# Patient Record
Sex: MALE | Race: WHITE
[De-identification: names, ages, dates, MRNs, and addresses within clinical notes are randomized per-mention and may not be internally consistent; named-entity substitution may affect disease eponyms.]

---

## 2022-03-23 LAB
BUN SERPL-MCNC: 14 MG/DL (ref 9–20)
BUN/CREAT SERPL: 18 %
CALCIUM SERPL-MCNC: 9.3 MG/DL (ref 8.4–10.2)
HCT VFR BLD CALC: 43.9 % (ref 35.5–45.6)
HEMOLYSIS INDEX: 6
HGB BLD-MCNC: 14.6 GM/DL (ref 11.8–15.2)
MCHC RBC AUTO-ENTMCNC: 33 % (ref 32–34)
MCV RBC AUTO: 94 FL (ref 84–94)
PLATELET # BLD: 173 K/MM3 (ref 140–440)
RBC # BLD AUTO: 4.65 M/MM3 (ref 3.65–5.03)

## 2022-03-24 ENCOUNTER — HOSPITAL ENCOUNTER (OUTPATIENT)
Dept: HOSPITAL 5 - OR | Age: 77
Discharge: HOME | End: 2022-03-24
Attending: SURGERY
Payer: COMMERCIAL

## 2022-03-24 VITALS — DIASTOLIC BLOOD PRESSURE: 86 MMHG | SYSTOLIC BLOOD PRESSURE: 145 MMHG

## 2022-03-24 DIAGNOSIS — Z85.46: ICD-10-CM

## 2022-03-24 DIAGNOSIS — E78.5: ICD-10-CM

## 2022-03-24 DIAGNOSIS — Z98.41: ICD-10-CM

## 2022-03-24 DIAGNOSIS — K40.41: Primary | ICD-10-CM

## 2022-03-24 DIAGNOSIS — Z98.42: ICD-10-CM

## 2022-03-24 DIAGNOSIS — K21.9: ICD-10-CM

## 2022-03-24 DIAGNOSIS — I10: ICD-10-CM

## 2022-03-24 DIAGNOSIS — Z20.822: ICD-10-CM

## 2022-03-24 DIAGNOSIS — J45.909: ICD-10-CM

## 2022-03-24 DIAGNOSIS — Z79.899: ICD-10-CM

## 2022-03-24 DIAGNOSIS — Z98.890: ICD-10-CM

## 2022-03-24 PROCEDURE — 85027 COMPLETE CBC AUTOMATED: CPT

## 2022-03-24 PROCEDURE — C1781 MESH (IMPLANTABLE): HCPCS

## 2022-03-24 PROCEDURE — 80048 BASIC METABOLIC PNL TOTAL CA: CPT

## 2022-03-24 PROCEDURE — 36415 COLL VENOUS BLD VENIPUNCTURE: CPT

## 2022-03-24 PROCEDURE — 49651 LAP ING HERNIA REPAIR RECUR: CPT

## 2022-03-24 PROCEDURE — U0003 INFECTIOUS AGENT DETECTION BY NUCLEIC ACID (DNA OR RNA); SEVERE ACUTE RESPIRATORY SYNDROME CORONAVIRUS 2 (SARS-COV-2) (CORONAVIRUS DISEASE [COVID-19]), AMPLIFIED PROBE TECHNIQUE, MAKING USE OF HIGH THROUGHPUT TECHNOLOGIES AS DESCRIBED BY CMS-2020-01-R: HCPCS

## 2022-03-24 RX ADMIN — HYDROMORPHONE HYDROCHLORIDE PRN MG: 1 INJECTION, SOLUTION INTRAMUSCULAR; INTRAVENOUS; SUBCUTANEOUS at 15:50

## 2022-03-24 RX ADMIN — HYDROMORPHONE HYDROCHLORIDE PRN MG: 1 INJECTION, SOLUTION INTRAMUSCULAR; INTRAVENOUS; SUBCUTANEOUS at 15:40

## 2022-03-24 RX ADMIN — HYDROMORPHONE HYDROCHLORIDE PRN MG: 1 INJECTION, SOLUTION INTRAMUSCULAR; INTRAVENOUS; SUBCUTANEOUS at 15:30

## 2022-03-24 NOTE — OPERATIVE REPORT
Operative Report


Operative Report: 





Date: 3/24/2022





Preop diagnosis: Left inguinal hernia





Postop diagnosis: Same





Procedure: Robotic left inguinal hernia repair with mesh





Surgeon: Dr. Perez





Assistant: Dr. Brown





Anesthesia: SHADIA estimated blood loss: Minimal





Specimen: None





Findings: This patient presents with bilateral inguinal hernias.  He is taken to

the OR and under general endotracheal anesthesia timeout is completed consents 

on the chart.  Patient received 2 g of Ancef IV.  Elias catheter is in place.





The abdomen is shaved and prepped with ChloraPrep.  Sterilely draped.





A supraumbilical incision is made.  Varies needle was placed through this.  

Abdomen is insufflated with CO2.  A 5 mm Visiport is used to gain access at the 

supraumbilical incision.  And the abdomen is inspected.





8 mm port is established in the right and left hypogastric areas.  The 5 Smith 

port is removed and a 12 mm port is established.  The robot is then docked in 

the usual fashion.





Cautery scissors is used in arm 1 and the bipolar grasper in arm two.  30 degree

angled scope was used.





Peritoneal incision is made in the left lower quadrant.  Properitoneal 

dissection is then performed to expose the pubic tubercle the hernia and cord 

structures, and the lateral space.  The umbilical hernia is completely dissected

free of the internal ring.





Left  large 3D Bard mesh is then placed into the abdomen.  The mesh is placed 

into the properitoneal dissection.  Medial and lateral sutures are used with 2-0

Vicryl to fix the edges of the mesh in place.  The peritoneal incision is in 

reapproximated using 3-0 barbed suture.





Sponge and needle counts are noted and are correct.  The supraumbilical incision

is closed with 2-0 Vicryl sutures.  CO2 is allowed to exit the abdomen.  All 

ports were then removed.  Skin incisions were closed with 4-0 Vicryl and 

Dermabond.  Patient tolerated seizure well.

## 2022-03-24 NOTE — ANESTHESIA CONSULTATION
Anesthesia Consult and Med Hx


Date of service: 03/24/22





- Airway


Anesthetic Teeth Evaluation: Good


ROM Head & Neck: Adequate


Mental/Hyoid Distance: Adequate


Mallampati Class: Class I


Intubation Access Assessment: Good





- Pulmonary Exam


CTA: Yes





- Cardiac Exam


Cardiac Exam: RRR





- Pre-Operative Health Status


ASA Pre-Surgery Classification: ASA2


Proposed Anesthetic Plan: General





- Pulmonary


Hx Smoking: No


Hx Pneumonia: No





- Central Nervous System


Hx Psychiatric Problems: No





- Other Systems


Hx Alcohol Use: No


Hx Substance Use: No


Hx Cancer: Yes

## 2022-03-24 NOTE — SHORT STAY SUMMARY
Short Stay Documentation


Date of service: 03/24/22


Narrative H&P: 





This 77-year-old [place]ese gentleman is admitted with a left inguinal hernia.





- History


Past Medical History: No medical history


Past Surgical History: No surgical history


Social history: no significant social history





- Allergies and Medications


Current Medications: 


                                    Allergies





No Known Allergies Allergy (Verified 03/23/22 11:11)


   





                                Home Medications











 Medication  Instructions  Recorded  Confirmed  Last Taken  Type


 


Omega-3 Acid Ethyl Esters [Lovaza] 1 gm PO DAILY 03/23/22 03/23/22 03/23/22 

History


 


QUEtiapine [SEROquel] 25 mg PO DAILY 03/23/22 03/23/22 03/23/22 History








Active Medications





Cefazolin Sodium (Ancef/Sterile Water 2 Gm/20 Ml)  2 gm in 20 mls @ 80 mls/hr IV

 PREOP NR; Protocol


   Stop: 03/24/22 20:00


Lactated Ringer's (Lactated Ringers)  1,000 mls @ 42 mls/hr IV AS DIRECT FRANK


   Last Admin: 03/24/22 09:40 Dose:  42 mls/hr


   











- Physical exam


General appearance: no acute distress


Integumentary: no rash


HEENT: Atraumatic


Gastrointestinal: normal, normoactive bowel sounds, other


Extremities: no ischemia, No edema





- Brief post op/procedure progress note


Date of procedure: 03/24/22


Pre-op diagnosis: Left inguinal hernia


Post-op diagnosis: same


Procedure: 





Roboticleft inguinal hernia repair w mesh


Anesthesia: SHADIA


Surgeon: SUE LOPEZ


Assistant: GAMA CRUZ


Estimated blood loss: minimal


Pathology: none





- Disposition


Condition at discharge: Good


Disposition: 01 HOME / SELF CARE / HOMELESS





Short Stay Discharge Plan


Activity: advance as tolerated, other (No heavy lifting.  Limit lifting to less 

than 20 pounds.)


Weight Bearing Status: Full Weight Bearing


Diet: regular


Wound: open to air, other (Patient may shower today.  Continue ice packs to the 

left groin twice daily for 3 days 20 minutes each time)


Follow up with: 


KARL BANDA MD [Primary Care Provider] - 7 Days


Prescriptions: 


Acetaminophen/Codeine [Tylenol /Codeine # 3 tab] 1 tab PO Q6H PRN 7 Days #10 tab

NS


 PRN Reason: Pain , Severe (7-10)

## 2022-08-19 ENCOUNTER — HOSPITAL ENCOUNTER (EMERGENCY)
Dept: HOSPITAL 5 - ED | Age: 77
LOS: 1 days | Discharge: HOME | End: 2022-08-20
Payer: MEDICARE

## 2022-08-19 VITALS — SYSTOLIC BLOOD PRESSURE: 132 MMHG | DIASTOLIC BLOOD PRESSURE: 80 MMHG

## 2022-08-19 DIAGNOSIS — R31.9: ICD-10-CM

## 2022-08-19 DIAGNOSIS — N39.0: ICD-10-CM

## 2022-08-19 DIAGNOSIS — R33.9: Primary | ICD-10-CM

## 2022-08-19 LAB
BASOPHILS # (AUTO): 0 K/MM3 (ref 0–0.1)
BASOPHILS NFR BLD AUTO: 0.2 % (ref 0–1.8)
BUN SERPL-MCNC: 12 MG/DL (ref 9–20)
BUN/CREAT SERPL: 15 %
CALCIUM SERPL-MCNC: 9 MG/DL (ref 8.4–10.2)
EOSINOPHIL # BLD AUTO: 0 K/MM3 (ref 0–0.4)
EOSINOPHIL NFR BLD AUTO: 0.4 % (ref 0–4.3)
HCT VFR BLD CALC: 41.1 % (ref 35.5–45.6)
HEMOLYSIS INDEX: 8
HGB BLD-MCNC: 13.7 GM/DL (ref 11.8–15.2)
LYMPHOCYTES # BLD AUTO: 0.5 K/MM3 (ref 1.2–5.4)
LYMPHOCYTES NFR BLD AUTO: 6.5 % (ref 13.4–35)
MCHC RBC AUTO-ENTMCNC: 33 % (ref 32–34)
MCV RBC AUTO: 94 FL (ref 84–94)
MONOCYTES # (AUTO): 0.4 K/MM3 (ref 0–0.8)
MONOCYTES % (AUTO): 5 % (ref 0–7.3)
PLATELET # BLD: 161 K/MM3 (ref 140–440)
RBC # BLD AUTO: 4.37 M/MM3 (ref 3.65–5.03)
RBC #/AREA URNS HPF: > 182 /HPF (ref 0–6)
WBC #/AREA URNS HPF: 50 /HPF (ref 0–6)

## 2022-08-19 PROCEDURE — 87086 URINE CULTURE/COLONY COUNT: CPT

## 2022-08-19 PROCEDURE — 85025 COMPLETE CBC W/AUTO DIFF WBC: CPT

## 2022-08-19 PROCEDURE — 96365 THER/PROPH/DIAG IV INF INIT: CPT

## 2022-08-19 PROCEDURE — 81001 URINALYSIS AUTO W/SCOPE: CPT

## 2022-08-19 PROCEDURE — 36415 COLL VENOUS BLD VENIPUNCTURE: CPT

## 2022-08-19 PROCEDURE — 51702 INSERT TEMP BLADDER CATH: CPT

## 2022-08-19 PROCEDURE — 74176 CT ABD & PELVIS W/O CONTRAST: CPT

## 2022-08-19 PROCEDURE — 96361 HYDRATE IV INFUSION ADD-ON: CPT

## 2022-08-19 PROCEDURE — 80048 BASIC METABOLIC PNL TOTAL CA: CPT

## 2022-08-19 PROCEDURE — 99284 EMERGENCY DEPT VISIT MOD MDM: CPT

## 2022-08-19 NOTE — CAT SCAN REPORT
CT abdomen pelvis wo con 



INDICATION / CLINICAL INFORMATION:

hematuria s/p urine retention, hx of prostate ca.



TECHNIQUE:

CT abdomen pelvis without contrast All CT scans at this location are performed using CT dose reductio
n for ALARA by means of automated exposure control. 



COMPARISON:

None available.



FINDINGS:





Abdomen and pelvis: The liver, gallbladder, spleen, pancreas adrenal glands are unremarkable. There i
s slight prominence involving the ureters and collecting systems bilaterally.



Moderate stool burden throughout much of the colon. Much of the small bowel is unremarkable.



Review of the urinary bladder demonstrates a 2.4 x 1.8 x 2.9 hyperdense mass within the dependent por
tion of the urinary bladder lumen. Brachytherapy type seeds are identified in the region of the prost
ate. No free pelvic fluid identified. Review of bone windows demonstrates mild thoracolumbar degenera
tive changes.



IMPRESSION: 2.9 x 2.4 x 1.8 cm hyperdense mass within the dependent portion of the urinary bladder ma
y represent hematoma versus underlying neoplasm from prostate recurrence. This mass appears to corres
pond to the vesicourethral junction and may be contributing to bladder outlet obstruction.



Signer Name: Scottie Haddad MD 

Signed: 8/19/2022 10:10 PM

Workstation Name: [id number]

## 2022-08-20 NOTE — EMERGENCY DEPARTMENT REPORT
ED Male  HPI





- General


Chief complaint: Urogenital-Male


Stated complaint: DYSURIA


Time Seen by Provider: 08/19/22 21:04


Source: EMS


Mode of arrival: Ambulatory


Limitations: Language Barrier





- History of Present Illness


Initial comments: 





77-year-old male with a past medical history of prostate cancer treated with 

radiation in 2005 currently in remission presents to the hospital complaining of

urinary retention.  Patient had did not have any urine output since 4 PM.  He 

developed severe suprapubic abdominal pain worse with palpation.  No alleviating

factors reported.  Prior to MD evaluation patient has spontaneous urine output 

at approximately 8:30 PM.  They report that he had a significant volume of urine

that was initially bloody, then pink, then cleared to yellow.  Patient feels 

much at time of my evaluation patient feels relief and is currently pain-free.  

Patient denies anticoagulant use.  He has not had an episode of urinary 

retention since initial prostate cancer treatment





- Related Data


                                Home Medications











 Medication  Instructions  Recorded  Confirmed  Last Taken


 


Omega-3 Acid Ethyl Esters [Lovaza] 1 gm PO DAILY 03/23/22 03/23/22 03/23/22


 


QUEtiapine [SEROquel] 25 mg PO DAILY 03/23/22 03/23/22 03/23/22








                                  Previous Rx's











 Medication  Instructions  Recorded  Last Taken  Type


 


Acetaminophen/Codeine [Tylenol 1 tab PO Q6H PRN 7 Days #10 tab NS 03/24/22 

Unknown Rx





/Codeine # 3 tab]    


 


cefUROXime [Ceftin] 500 mg PO Q12H 10 Days  tab 08/20/22 Unknown Rx











                                    Allergies











Allergy/AdvReac Type Severity Reaction Status Date / Time


 


No Known Allergies Allergy   Verified 03/23/22 11:11














ED Review of Systems


ROS: 


Stated complaint: DYSURIA


Other details as noted in HPI





Comment: All other systems reviewed and negative





ED Past Medical Hx





- Past Medical History


Hx HIV: No





- Surgical History


Additional Surgical History: Left inguinal hernia repair March 2022





- Social History


Smoking Status: Never Smoker





- Medications


Home Medications: 


                                Home Medications











 Medication  Instructions  Recorded  Confirmed  Last Taken  Type


 


Omega-3 Acid Ethyl Esters [Lovaza] 1 gm PO DAILY 03/23/22 03/23/22 03/23/22 

History


 


QUEtiapine [SEROquel] 25 mg PO DAILY 03/23/22 03/23/22 03/23/22 History


 


Acetaminophen/Codeine [Tylenol 1 tab PO Q6H PRN 7 Days #10 tab NS 03/24/22  

Unknown Rx





/Codeine # 3 tab]     


 


cefUROXime [Ceftin] 500 mg PO Q12H 10 Days  tab 08/20/22  Unknown Rx














ED Physical Exam





- General


Limitations: Language Barrier





- Other


Other exam information: 





General: No acute distress


Head: Atraumatic


Eyes: normal appearance


ENT: Moist mucous membranes


Neck: Normal appearance, no midline tenderness


Chest: Clear to auscultation bilaterally


CV: Regular rate and rhythm


Abdomen: Soft, normal bowel sounds, nontender, nondistended, no rebound or 

guarding


Back: Normal inspection


Extremity: Normal inspection, full range of motion


Neuro: Alert O x 3, no facial asymmetry, speech clear, no gross motor sensory 

deficit


Psych: Appropriate behavior


Skin: No rash





ED Course





                                   Vital Signs











  08/19/22





  20:49


 


Temperature 98.7 F


 


Pulse Rate 90


 


Respiratory 18





Rate 


 


Blood Pressure 132/80





[Left] 


 


O2 Sat by Pulse 96





Oximetry 














ED Medical Decision Making





- Lab Data


Result diagrams: 


                                 08/19/22 21:47





                                 08/19/22 21:47








                                   Lab Results











  08/19/22 08/19/22 08/19/22 Range/Units





  21:47 21:47 Unknown 


 


WBC  7.7    (4.5-11.0)  K/mm3


 


RBC  4.37    (3.65-5.03)  M/mm3


 


Hgb  13.7    (11.8-15.2)  gm/dl


 


Hct  41.1    (35.5-45.6)  %


 


MCV  94    (84-94)  fl


 


MCH  31    (28-32)  pg


 


MCHC  33    (32-34)  %


 


RDW  13.1 L    (13.2-15.2)  %


 


Plt Count  161    (140-440)  K/mm3


 


Lymph % (Auto)  6.5 L    (13.4-35.0)  %


 


Mono % (Auto)  5.0    (0.0-7.3)  %


 


Eos % (Auto)  0.4    (0.0-4.3)  %


 


Baso % (Auto)  0.2    (0.0-1.8)  %


 


Lymph # (Auto)  0.5 L    (1.2-5.4)  K/mm3


 


Mono # (Auto)  0.4    (0.0-0.8)  K/mm3


 


Eos # (Auto)  0.0    (0.0-0.4)  K/mm3


 


Baso # (Auto)  0.0    (0.0-0.1)  K/mm3


 


Seg Neutrophils %  87.9 H    (40.0-70.0)  %


 


Seg Neutrophils #  6.8    (1.8-7.7)  K/mm3


 


Sodium   141   (137-145)  mmol/L


 


Potassium   3.7   (3.6-5.0)  mmol/L


 


Chloride   105.2   ()  mmol/L


 


Carbon Dioxide   26   (22-30)  mmol/L


 


Anion Gap   14   mmol/L


 


BUN   12   (9-20)  mg/dL


 


Creatinine   0.8   (0.8-1.3)  mg/dL


 


Estimated GFR   > 60   ml/min


 


BUN/Creatinine Ratio   15   %


 


Glucose   111 H   ()  mg/dL


 


Calcium   9.0   (8.4-10.2)  mg/dL


 


Urine Color    Red  (Yellow)  


 


Urine Turbidity    Turbid  (Clear)  


 


Specific Gravity (Man)    1.020  (1.003-1.030)  


 


Ur Protein (Man)    3+  (Negative)  mg/dL


 


Ur Ketones (Man)    Negative  (Negative)  


 


Urine Bilirubin (Man)    Negative  (Negative)  


 


Urine WBC (Auto)    50.0 H  (0.0-6.0)  /HPF


 


Urine RBC (Auto)    > 182.0  (0.0-6.0)  /HPF


 


Urine RBC (Manual)    4+  (Negative)  














- Radiology Data


Radiology results: report reviewed








CT abdomen pelvis wo con   


 


 INDICATION / CLINICAL INFORMATION:  


 hematuria s/p urine retention, hx of prostate ca.  


 


 TECHNIQUE:  


 CT abdomen pelvis without contrast All CT scans at this location are performed 

using CT dose 


reduction for ALARA by means of automated exposure control.   


 


 COMPARISON:  


 None available.  


 


 FINDINGS:  


 


 


 Abdomen and pelvis: The liver, gallbladder, spleen, pancreas adrenal glands are

 unremarkable. There


is slight prominence involving the ureters and collecting systems bilaterally.  


 


 Moderate stool burden throughout much of the colon. Much of the small bowel is 

unremarkable.  


 


 Review of the urinary bladder demonstrates a 2.4 x 1.8 x 2.9 hyperdense mass 

within the dependent 


portion of the urinary bladder lumen. Brachytherapy type seeds are identified in

 the region of the 


prostate. No free pelvic fluid identified. Review of bone windows demonstrates 

mild thoracolumbar 


degenerative changes.  


 


 IMPRESSION: 2.9 x 2.4 x 1.8 cm hyperdense mass within the dependent portion of 

the urinary bladder 


may represent hematoma versus underlying neoplasm from prostate recurrence. This

 mass appears to 


correspond to the vesicourethral junction and may be contributing to bladder 

outlet obstruction.  





- Medical Decision Making





77-year male presents to the hospital with initial urine obstruction which spon

taneously resolved.  Patient had a post void residual 93 mL as per nurse.  Elias

 catheter was placed after assessment of the CT scan due to concerns of residual

 hematuria versus mass and risk of recurrent obstruction.  18 [place] coud Elias

 catheter was placed by nurse after failed attempt of 18 for three-way catheter 

placement.  Patient had 250 mL of urine output which is initially bloody but the

n turned to light pink in color.  UA positive for infection.  Patient treated 

with IV Rocephin.  No leukocytosis or signs of sepsis.  Patient will be provided

 a Elias leg bag attachment with instructions regarding use prior to discharge. 

 He will be discharged on cephalosporin antibiotic for UTI and encouraged to 

follow-up with urology next week.


Critical Care Time: No


Critical care attestation.: 


If time is entered above; I have spent that time in minutes in the direct care 

of this critically ill patient, excluding procedure time.








ED Disposition


Clinical Impression: 


 Urine retention, Hematuria, UTI (urinary tract infection)





Disposition: 01 HOME / SELF CARE / HOMELESS


Is pt being admited?: No


Condition: Stable


Instructions:  Urinary Tract Infection, Adult, Hematuria, Adult, Acute Urinary 

Retention, Male, Indwelling Urinary Catheter Care, Adult, Easy-to-Read


Additional Instructions: 


Take the medication as prescribed.  It is very important you follow-up with your

 urologist for further work-up and evaluation to determine if your blood in the 

urine is caused by bladder cancer or recurrent prostate cancer.  Please return 

if symptoms worsen as indicated by your discharge instructions.  Take the copy 

of the labs and CT report provided to your doctor for follow-up.


Prescriptions: 


cefUROXime [Ceftin] 500 mg PO Q12H 10 Days  tab


Referrals: 


INDIGO ENCINAS MD [Staff Physician] - 3-5 Days (Urologist)